# Patient Record
Sex: MALE | Race: BLACK OR AFRICAN AMERICAN | NOT HISPANIC OR LATINO | Employment: OTHER | RURAL
[De-identification: names, ages, dates, MRNs, and addresses within clinical notes are randomized per-mention and may not be internally consistent; named-entity substitution may affect disease eponyms.]

---

## 2023-10-01 ENCOUNTER — HOSPITAL ENCOUNTER (EMERGENCY)
Facility: HOSPITAL | Age: 63
Discharge: HOME | End: 2023-10-01

## 2023-10-01 VITALS
TEMPERATURE: 98 F | SYSTOLIC BLOOD PRESSURE: 166 MMHG | HEIGHT: 73 IN | DIASTOLIC BLOOD PRESSURE: 99 MMHG | OXYGEN SATURATION: 97 % | RESPIRATION RATE: 18 BRPM | HEART RATE: 76 BPM | BODY MASS INDEX: 41.75 KG/M2 | WEIGHT: 315 LBS

## 2023-10-01 DIAGNOSIS — I10 HYPERTENSION: Primary | ICD-10-CM

## 2023-10-01 DIAGNOSIS — R60.0 BILATERAL LOWER EXTREMITY EDEMA: ICD-10-CM

## 2023-10-01 LAB
ALBUMIN SERPL BCP-MCNC: 3.3 G/DL (ref 3.5–5)
ALBUMIN/GLOB SERPL: 0.9 {RATIO}
ALP SERPL-CCNC: 101 U/L (ref 45–115)
ALT SERPL W P-5'-P-CCNC: 25 U/L (ref 16–61)
AMORPH PHOS CRY #/AREA URNS LPF: ABNORMAL /LPF
ANION GAP SERPL CALCULATED.3IONS-SCNC: 11 MMOL/L (ref 7–16)
ANISOCYTOSIS BLD QL SMEAR: NORMAL
AST SERPL W P-5'-P-CCNC: 16 U/L (ref 15–37)
BACTERIA #/AREA URNS HPF: ABNORMAL /HPF
BASOPHILS # BLD AUTO: 0.04 K/UL (ref 0–0.2)
BASOPHILS NFR BLD AUTO: 0.4 % (ref 0–1)
BILIRUB SERPL-MCNC: 0.2 MG/DL (ref ?–1.2)
BILIRUB UR QL STRIP: NEGATIVE
BUN SERPL-MCNC: 12 MG/DL (ref 7–18)
BUN/CREAT SERPL: 15 (ref 6–20)
CALCIUM SERPL-MCNC: 9.4 MG/DL (ref 8.5–10.1)
CHLORIDE SERPL-SCNC: 105 MMOL/L (ref 98–107)
CLARITY UR: CLEAR
CO2 SERPL-SCNC: 31 MMOL/L (ref 21–32)
COLOR UR: YELLOW
CREAT SERPL-MCNC: 0.8 MG/DL (ref 0.7–1.3)
DIFFERENTIAL METHOD BLD: ABNORMAL
EGFR (NO RACE VARIABLE) (RUSH/TITUS): 99 ML/MIN/1.73M2
EOSINOPHIL # BLD AUTO: 0.42 K/UL (ref 0–0.5)
EOSINOPHIL NFR BLD AUTO: 4.5 % (ref 1–4)
ERYTHROCYTE [DISTWIDTH] IN BLOOD BY AUTOMATED COUNT: 16.5 % (ref 11.5–14.5)
GLOBULIN SER-MCNC: 3.5 G/DL (ref 2–4)
GLUCOSE SERPL-MCNC: 106 MG/DL (ref 74–106)
GLUCOSE UR STRIP-MCNC: NEGATIVE MG/DL
HCT VFR BLD AUTO: 43 % (ref 40–54)
HGB BLD-MCNC: 13.6 G/DL (ref 13.5–18)
HYPOCHROMIA BLD QL SMEAR: NORMAL
KETONES UR STRIP-SCNC: NEGATIVE MG/DL
LEUKOCYTE ESTERASE UR QL STRIP: NEGATIVE
LYMPHOCYTES # BLD AUTO: 1.86 K/UL (ref 1–4.8)
LYMPHOCYTES NFR BLD AUTO: 19.9 % (ref 27–41)
MAGNESIUM SERPL-MCNC: 2 MG/DL (ref 1.7–2.3)
MCH RBC QN AUTO: 27.3 PG (ref 27–31)
MCHC RBC AUTO-ENTMCNC: 31.6 G/DL (ref 32–36)
MCV RBC AUTO: 86.3 FL (ref 80–96)
MONOCYTES # BLD AUTO: 0.65 K/UL (ref 0–0.8)
MONOCYTES NFR BLD AUTO: 6.9 % (ref 2–6)
MPC BLD CALC-MCNC: 10.8 FL (ref 9.4–12.4)
MUCOUS THREADS #/AREA URNS HPF: ABNORMAL /HPF
NEUTROPHILS # BLD AUTO: 6.4 K/UL (ref 1.8–7.7)
NEUTROPHILS NFR BLD AUTO: 68.3 % (ref 53–65)
NITRITE UR QL STRIP: NEGATIVE
NT-PROBNP SERPL-MCNC: 37 PG/ML (ref 1–125)
PH UR STRIP: 7 PH UNITS
PLATELET # BLD AUTO: 194 K/UL (ref 150–400)
PLATELET MORPHOLOGY: NORMAL
POTASSIUM SERPL-SCNC: 3.1 MMOL/L (ref 3.5–5.1)
PROT SERPL-MCNC: 6.8 G/DL (ref 6.4–8.2)
PROT UR QL STRIP: NEGATIVE
RBC # BLD AUTO: 4.98 M/UL (ref 4.6–6.2)
RBC # UR STRIP: ABNORMAL /UL
RBC #/AREA URNS HPF: ABNORMAL /HPF
SODIUM SERPL-SCNC: 144 MMOL/L (ref 136–145)
SP GR UR STRIP: 1.02
SQUAMOUS #/AREA URNS LPF: ABNORMAL /LPF
TROPONIN I SERPL DL<=0.01 NG/ML-MCNC: 20.3 PG/ML
UROBILINOGEN UR STRIP-ACNC: 1 MG/DL
WBC # BLD AUTO: 9.37 K/UL (ref 4.5–11)
WBC #/AREA URNS HPF: ABNORMAL /HPF

## 2023-10-01 PROCEDURE — 83880 ASSAY OF NATRIURETIC PEPTIDE: CPT | Performed by: NURSE PRACTITIONER

## 2023-10-01 PROCEDURE — 93010 EKG 12-LEAD: ICD-10-PCS | Mod: ,,, | Performed by: HOSPITALIST

## 2023-10-01 PROCEDURE — 25000242 PHARM REV CODE 250 ALT 637 W/ HCPCS: Performed by: NURSE PRACTITIONER

## 2023-10-01 PROCEDURE — 81001 URINALYSIS AUTO W/SCOPE: CPT | Performed by: NURSE PRACTITIONER

## 2023-10-01 PROCEDURE — 85025 COMPLETE CBC W/AUTO DIFF WBC: CPT | Performed by: NURSE PRACTITIONER

## 2023-10-01 PROCEDURE — 84484 ASSAY OF TROPONIN QUANT: CPT | Performed by: NURSE PRACTITIONER

## 2023-10-01 PROCEDURE — 93010 ELECTROCARDIOGRAM REPORT: CPT | Mod: ,,, | Performed by: HOSPITALIST

## 2023-10-01 PROCEDURE — 96374 THER/PROPH/DIAG INJ IV PUSH: CPT

## 2023-10-01 PROCEDURE — 99284 EMERGENCY DEPT VISIT MOD MDM: CPT | Mod: ,,, | Performed by: NURSE PRACTITIONER

## 2023-10-01 PROCEDURE — 63600175 PHARM REV CODE 636 W HCPCS: Performed by: NURSE PRACTITIONER

## 2023-10-01 PROCEDURE — 99284 PR EMERGENCY DEPT VISIT,LEVEL IV: ICD-10-PCS | Mod: ,,, | Performed by: NURSE PRACTITIONER

## 2023-10-01 PROCEDURE — 25000003 PHARM REV CODE 250: Performed by: NURSE PRACTITIONER

## 2023-10-01 PROCEDURE — 83735 ASSAY OF MAGNESIUM: CPT | Performed by: NURSE PRACTITIONER

## 2023-10-01 PROCEDURE — 80053 COMPREHEN METABOLIC PANEL: CPT | Performed by: NURSE PRACTITIONER

## 2023-10-01 PROCEDURE — 93005 ELECTROCARDIOGRAM TRACING: CPT

## 2023-10-01 PROCEDURE — 94640 AIRWAY INHALATION TREATMENT: CPT

## 2023-10-01 PROCEDURE — 94761 N-INVAS EAR/PLS OXIMETRY MLT: CPT

## 2023-10-01 PROCEDURE — 99285 EMERGENCY DEPT VISIT HI MDM: CPT | Mod: 25

## 2023-10-01 RX ORDER — POTASSIUM CHLORIDE 20 MEQ/1
40 TABLET, EXTENDED RELEASE ORAL
Status: COMPLETED | OUTPATIENT
Start: 2023-10-01 | End: 2023-10-01

## 2023-10-01 RX ORDER — POTASSIUM CHLORIDE 20 MEQ/1
40 TABLET, EXTENDED RELEASE ORAL ONCE
Status: DISCONTINUED | OUTPATIENT
Start: 2023-10-01 | End: 2023-10-01

## 2023-10-01 RX ORDER — ACETAMINOPHEN 325 MG/1
650 TABLET ORAL
Status: COMPLETED | OUTPATIENT
Start: 2023-10-01 | End: 2023-10-01

## 2023-10-01 RX ORDER — FUROSEMIDE 10 MG/ML
20 INJECTION INTRAMUSCULAR; INTRAVENOUS
Status: COMPLETED | OUTPATIENT
Start: 2023-10-01 | End: 2023-10-01

## 2023-10-01 RX ORDER — TRIAMTERENE/HYDROCHLOROTHIAZID 37.5-25 MG
1 TABLET ORAL
COMMUNITY
Start: 2023-08-02 | End: 2023-10-01

## 2023-10-01 RX ORDER — IPRATROPIUM BROMIDE AND ALBUTEROL SULFATE 2.5; .5 MG/3ML; MG/3ML
3 SOLUTION RESPIRATORY (INHALATION)
Status: COMPLETED | OUTPATIENT
Start: 2023-10-01 | End: 2023-10-01

## 2023-10-01 RX ORDER — LOSARTAN POTASSIUM 50 MG/1
50 TABLET ORAL DAILY
Qty: 30 TABLET | Refills: 0 | Status: SHIPPED | OUTPATIENT
Start: 2023-10-01 | End: 2023-10-31

## 2023-10-01 RX ORDER — LOSARTAN POTASSIUM 25 MG/1
25 TABLET ORAL
COMMUNITY
Start: 2023-08-02 | End: 2023-10-01

## 2023-10-01 RX ORDER — POTASSIUM CHLORIDE 20 MEQ/1
20 TABLET, EXTENDED RELEASE ORAL 2 TIMES DAILY
Qty: 4 TABLET | Refills: 0 | Status: SHIPPED | OUTPATIENT
Start: 2023-10-01 | End: 2023-10-03

## 2023-10-01 RX ORDER — HYDROCHLOROTHIAZIDE 25 MG/1
25 TABLET ORAL DAILY
Qty: 30 TABLET | Refills: 0 | Status: SHIPPED | OUTPATIENT
Start: 2023-10-01 | End: 2024-09-30

## 2023-10-01 RX ADMIN — FUROSEMIDE 20 MG: 10 INJECTION, SOLUTION INTRAMUSCULAR; INTRAVENOUS at 09:10

## 2023-10-01 RX ADMIN — IPRATROPIUM BROMIDE AND ALBUTEROL SULFATE 3 ML: .5; 3 SOLUTION RESPIRATORY (INHALATION) at 08:10

## 2023-10-01 RX ADMIN — ACETAMINOPHEN 650 MG: 325 TABLET ORAL at 09:10

## 2023-10-01 RX ADMIN — POTASSIUM CHLORIDE 40 MEQ: 1500 TABLET, EXTENDED RELEASE ORAL at 10:10

## 2023-10-01 NOTE — ED PROVIDER NOTES
Encounter Date: 10/1/2023       History     Chief Complaint   Patient presents with    Leg Swelling     Presents with c/o BLE pain and edema x1 week. Denies taking HTN meds for two months.      Presented with c/o swelling to lower ext bilat, pain to left head radiating to left neck with tingling sensation, and periods of diaphoresis during sleep. PMH of DM, HTN. States has not had meds in 2 months. Reports that he moved from [place] 5 mo ago and has not been to a doctor since then. Smokes 1/2 per day. Denies SOB, chest pain, focal weakness, change in speech or swallowing.      Review of patient's allergies indicates:  No Known Allergies  Past Medical History:   Diagnosis Date    CHF (congestive heart failure)     Diabetes mellitus     Hypertension      Past Surgical History:   Procedure Laterality Date    HAND SURGERY Right     Truamatic injury    KNEE SURGERY Left      No family history on file.  Social History     Tobacco Use    Smoking status: Every Day     Types: Cigarettes    Smokeless tobacco: Never   Substance Use Topics    Alcohol use: Not Currently    Drug use: Not Currently     Review of Systems   Constitutional:  Positive for activity change. Negative for appetite change.   HENT:  Negative for congestion.    Respiratory:  Negative for cough, chest tightness, shortness of breath and wheezing.    Cardiovascular:  Positive for leg swelling. Negative for chest pain and palpitations.   Gastrointestinal:  Negative for abdominal pain, nausea and vomiting.   Genitourinary:  Negative for difficulty urinating.   Musculoskeletal:  Positive for arthralgias.   Neurological:  Positive for headaches. Negative for dizziness, speech difficulty, weakness and light-headedness.       Physical Exam     Initial Vitals [10/01/23 0700]   BP Pulse Resp Temp SpO2   (!) 174/116 86 18 98.3 °F (36.8 °C) 95 %      MAP       --         Physical Exam    Nursing note and vitals reviewed.  Constitutional: He appears well-developed and  well-nourished. No distress.   HENT:   Head: Normocephalic.   Right Ear: External ear normal.   Left Ear: External ear normal.   Nose: Nose normal.   Mouth/Throat: Oropharynx is clear and moist.   Eyes: Conjunctivae and EOM are normal. Pupils are equal, round, and reactive to light.   Neck: Neck supple.   Normal range of motion.  Cardiovascular:  Normal rate, regular rhythm, normal heart sounds and intact distal pulses.           No murmur heard.  Pulmonary/Chest: He has wheezes (few scattered).   Abdominal: Abdomen is soft. Bowel sounds are normal. There is no abdominal tenderness.   Musculoskeletal:         General: Edema (bilat 1+ pitting feet and ankles) present. No tenderness.      Cervical back: Normal range of motion and neck supple.     Neurological: GCS score is 15. GCS eye subscore is 4. GCS verbal subscore is 5. GCS motor subscore is 6.   Skin: Skin is warm and dry. Capillary refill takes less than 2 seconds.   Psychiatric: He has a normal mood and affect.         Medical Screening Exam   See Full Note    ED Course   Procedures  Labs Reviewed   COMPREHENSIVE METABOLIC PANEL - Abnormal; Notable for the following components:       Result Value    Potassium 3.1 (*)     Albumin 3.3 (*)     All other components within normal limits   URINALYSIS, REFLEX TO URINE CULTURE - Abnormal; Notable for the following components:    Blood, UA Trace-Intact (*)     All other components within normal limits   CBC WITH DIFFERENTIAL - Abnormal; Notable for the following components:    MCHC 31.6 (*)     RDW 16.5 (*)     Neutrophils % 68.3 (*)     Lymphocytes % 19.9 (*)     Monocytes % 6.9 (*)     Eosinophils % 4.5 (*)     All other components within normal limits   URINALYSIS, MICROSCOPIC - Abnormal; Notable for the following components:    Bacteria, UA Few (*)     Mucus, UA Few (*)     Amorphous Crystals, UA Moderate (*)     All other components within normal limits   MAGNESIUM - Normal   TROPONIN I - Normal   NT-PRO  NATRIURETIC PEPTIDE - Normal   CBC W/ AUTO DIFFERENTIAL    Narrative:     The following orders were created for panel order CBC auto differential.  Procedure                               Abnormality         Status                     ---------                               -----------         ------                     CBC with Differential[[id number]]       Abnormal            Final result                 Please view results for these tests on the individual orders.   CBC MORPHOLOGY     EKG Readings: (Independently Interpreted)   Initial Reading: No STEMI. Rhythm: Normal Sinus Rhythm. Heart Rate: 88.   Reviewed at 0702. No previous for comparison.       Imaging Results              CT Head Without Contrast (Final result)  Result time 10/01/23 09:22:20      Final result by Connor Jackson MD (10/01/23 09:22:20)                   Impression:      1. No acute intracranial abnormality.    2. Mild generalized parenchymal atrophy and findings suggestive of sequela of chronic microvascular ischemia.    Place of service: [facility]      Electronically signed by: Connor Jackson  Date:    10/01/2023  Time:    09:22               Narrative:    EXAMINATION:  CT HEAD WITHOUT CONTRAST    CLINICAL HISTORY:  Headache, new or worsening (Age >= 50y);    TECHNIQUE:  Axial CT imaging from the vertex to skull the skull base was performed without contrast. Total DLP: 816 mGy*cm    Dose reduction:    The CT exam was performed using one or more dose reduction techniques: Automatic exposure control, automated adjustment of the MA and/or kVP according to patient size, or use of iterative reconstruction technique.    COMPARISON:  None.    FINDINGS:  Mild atrophy is noted with prominence of sulci and ventricles.  The basilar cisterns are within normal limits in appearance. There is no evidence of hydrocephalus, midline shift or mass effect.  Periventricular hypodensity changes are noted which do not demonstrate mass effect on are  favored to represent sequela of chronic microvascular ischemia.  Otherwise, gray and white matter differentiation is preserved. There is no CT evidence of acute intracranial hemorrhage or infarction.    The calvarium is intact. The visualized orbits and globes appear within normal limits. The paranasal sinuses and mastoid air cells are predominantly clear. Scalp soft tissues appear unremarkable.                                       X-Ray Chest AP Portable (Final result)  Result time 10/01/23 09:17:13      Final result by Connor Jackson MD (10/01/23 09:17:13)                   Impression:      No acute cardiopulmonary process.    Place of service: [facility]      Electronically signed by: Connor Jackson  Date:    10/01/2023  Time:    09:17               Narrative:    EXAMINATION:  XR CHEST AP PORTABLE    CLINICAL HISTORY:  Chest Pain;    TECHNIQUE:  AP portable    COMPARISON:  None    FINDINGS:  The cardiomediastinal silhouette is within normal limits. The lungs are clear. There is no pneumothorax or pleural effusion.    There is no acute osseous or soft tissue abnormality.                                    X-Rays:   Independently Interpreted Readings:   Chest X-Ray: No acute cardiopulmonary process.   Head CT: 1. No acute intracranial abnormality.     2. Mild generalized parenchymal atrophy and findings suggestive of sequela of chronic microvascular ischemia.     Medications   furosemide injection 20 mg (20 mg Intravenous Given 10/1/23 0926)   albuterol-ipratropium 2.5 mg-0.5 mg/3 mL nebulizer solution 3 mL (3 mLs Nebulization Given 10/1/23 0845)   acetaminophen tablet 650 mg (650 mg Oral Given 10/1/23 0921)   potassium chloride SA CR tablet 40 mEq (40 mEq Oral Given 10/1/23 1000)     Medical Decision Making  Presented with c/o swelling to lower ext bilat, pain to left head radiating to left neck with tingling sensation, and periods of diaphoresis during sleep. PMH of DM, HTN. States has not  had meds in 2 months. Reports that he moved from [place] 5 mo ago and has not been to a doctor since then. Smokes 1/2 per day. Denies SOB, chest pain, focal weakness, change in speech or swallowing.    Amount and/or Complexity of Data Reviewed  Labs: ordered. Decision-making details documented in ED Course.     Details: Troponin 20, BNP 37, WBC 9370 with H&H 13.6 and 43, plt [id number], Na 144, K+ 3.1m BUN 12, creatinine 0.8, ALT 25, AST 16,  Radiology: ordered.     Details: CXR shows no acute findings. CT head shows chronic changes with no acute findings.    Risk  OTC drugs.  Prescription drug management.  Risk Details: Lasix 20 mg IVP given. Explained to pt importance of BP control with diet and meds. Med list from outside sources reviewed. Rx for Losartan and HCTZ and KCL (x 2 days). Instructed to follow-up with the [facility] this week to start PCP care here since he plans on staying in this area. Discharged home with detailed home care, med, diet, follow-up and return precautions.               ED Course as of 10/01/23 1029   Sun Oct 01, 2023   0830 Wheezing bilat. O2 sat 92-94% on RA. Smokes 1/2 daily. Bilat lower leg swelling. States can hardly walk due to pain from the swelling in feet and legs. Reports PMH of CHF.  Pt also states that he has been having headache on left side of his head for several days. He reports that his left face was twisted when he woke up on 9/20 but resolved within hours. [DP]   0831 WBC: 9.37 [DP]   0831 Hemoglobin: 13.6 [DP]   0831 Hematocrit: 43.0 [DP]   0831 Platelets: 194 [DP]   0838 Sodium: 144 [DP]   0838 Potassium(!): 3.1 [DP]   0838 BUN: 12 [DP]   0838 Creatinine: 0.80 [DP]   0838 ALT: 25 [DP]   0838 AST: 16 [DP]   0839 Troponin I High Sensitivity: 20.3 [DP]   0839 NT-proBNP: 37 [DP]      ED Course User Index  [DP] Katty Tello NP                      Clinical Impression:   Final diagnoses:  [I10] Hypertension (Primary)  [R60.0] Bilateral lower extremity  edema        ED Disposition Condition    Discharge Stable          ED Prescriptions       Medication Sig Dispense Start Date End Date Auth. Provider    losartan (COZAAR) 50 MG tablet Take 1 tablet (50 mg total) by mouth once daily. 30 tablet 10/1/2023 10/31/2023 Katty Tello NP    hydroCHLOROthiazide (HYDRODIURIL) 25 MG tablet Take 1 tablet (25 mg total) by mouth once daily. 30 tablet 10/1/2023 9/30/2024 Katty Tello NP    potassium chloride SA (K-DUR,KLOR-CON) 20 MEQ tablet Take 1 tablet (20 mEq total) by mouth 2 (two) times daily. for 2 days 4 tablet 10/1/2023 10/3/2023 Katty Tello NP          Follow-up Information       Follow up With Specialties Details Why Contact Info    Jersey Alejandre MD Family Medicine Schedule an appointment as soon as possible for a visit in 3 days  [address] [place] [address]  [facility] [place] [address]  [phone]               Katty Tello NP  10/01/23 8514

## 2023-10-01 NOTE — DISCHARGE INSTRUCTIONS
Elevate your feet and legs to help reduce swelling. Low salt diet. Take Losartan 50 mg and hydrochlorothiazide 25 mg daily for your BP. Take potassium chloride tablet twice a day for the next 2 days. Since you have moved here to stay, you will need to follow-up with a primary care provided in this area. Call the [facility] tomorrow for an appointment this week. Let them you that you were seen in the ED here today.